# Patient Record
Sex: MALE | Employment: FULL TIME | URBAN - METROPOLITAN AREA
[De-identification: names, ages, dates, MRNs, and addresses within clinical notes are randomized per-mention and may not be internally consistent; named-entity substitution may affect disease eponyms.]

---

## 2023-08-15 ENCOUNTER — APPOINTMENT (OUTPATIENT)
Dept: PRIMARY CARE | Facility: CLINIC | Age: 53
End: 2023-08-15
Payer: OTHER GOVERNMENT

## 2023-09-05 ENCOUNTER — OFFICE VISIT (OUTPATIENT)
Dept: PRIMARY CARE | Facility: CLINIC | Age: 53
End: 2023-09-05
Payer: OTHER GOVERNMENT

## 2023-09-05 VITALS
TEMPERATURE: 97.9 F | BODY MASS INDEX: 29.24 KG/M2 | HEART RATE: 61 BPM | DIASTOLIC BLOOD PRESSURE: 81 MMHG | HEIGHT: 70 IN | OXYGEN SATURATION: 95 % | SYSTOLIC BLOOD PRESSURE: 122 MMHG | WEIGHT: 204.25 LBS

## 2023-09-05 DIAGNOSIS — Z12.11 SCREENING FOR COLON CANCER: ICD-10-CM

## 2023-09-05 DIAGNOSIS — Z82.49 FAMILY HISTORY OF EARLY CAD: ICD-10-CM

## 2023-09-05 DIAGNOSIS — E78.5 HYPERLIPIDEMIA, UNSPECIFIED HYPERLIPIDEMIA TYPE: Primary | ICD-10-CM

## 2023-09-05 PROCEDURE — 99203 OFFICE O/P NEW LOW 30 MIN: CPT | Performed by: FAMILY MEDICINE

## 2023-09-05 PROCEDURE — 1036F TOBACCO NON-USER: CPT | Performed by: FAMILY MEDICINE

## 2023-09-05 RX ORDER — CETIRIZINE HYDROCHLORIDE 10 MG/1
TABLET ORAL
COMMUNITY
Start: 2022-10-05

## 2023-09-05 RX ORDER — ALLOPURINOL 300 MG/1
1 TABLET ORAL DAILY
COMMUNITY
Start: 2018-05-17

## 2023-09-05 RX ORDER — ALLOPURINOL 100 MG/1
1 TABLET ORAL DAILY
COMMUNITY
Start: 2018-05-17

## 2023-09-05 RX ORDER — NAPROXEN 250 MG/1
250 TABLET ORAL
COMMUNITY

## 2023-09-05 ASSESSMENT — ENCOUNTER SYMPTOMS
LIGHT-HEADEDNESS: 0
SHORTNESS OF BREATH: 0
ABDOMINAL PAIN: 0
NUMBNESS: 0
WEAKNESS: 0
CONFUSION: 0
BLOOD IN STOOL: 0
FEVER: 0
UNEXPECTED WEIGHT CHANGE: 0
DYSURIA: 0
VOMITING: 0
DIZZINESS: 0
TROUBLE SWALLOWING: 0
COUGH: 0
CHILLS: 0
WHEEZING: 0
DIARRHEA: 0
DIFFICULTY URINATING: 0
NAUSEA: 0

## 2023-09-05 NOTE — ASSESSMENT & PLAN NOTE
"Discussed CT coronary artery calcium score, including risk of \"unnecessary\" procedures. He would like to do it.  "

## 2023-09-05 NOTE — PROGRESS NOTES
"Subjective   Patient ID: Loc Ellis is a 52 y.o. male who presents for Establish Care. States he found our practice online through his insurance company. [facility] are treating him for right hip tendonitis and arthritis in his lower back. Taking Naproxen. [facility] with his [facility] and back pain. No additional concerns; preferred to have a physician close to home in addition to the [facility].     HPI     Recently moved from [place]. [facility], will continue to see them for gout, hip and back problems. Not sure what his last uric acid level was, but was done recently. Hasn't had a significant gouty flare in a while. Takes Naproxen 250 mg once every day, takes fairly regularly.    Reports last cholesterol LDL a little high, not sure of the number. Last checked March 2023. Has since been trying to make dietary and lifestyle changes.    Reports up-to-date on colon cancer screening, colonoscopy last done 2015 or 2016, and due at 10y.    Review of Systems   Constitutional:  Negative for chills, fever and unexpected weight change.   HENT:  Negative for ear pain and trouble swallowing.    Respiratory:  Negative for cough, shortness of breath and wheezing.    Cardiovascular:  Negative for chest pain.   Gastrointestinal:  Negative for abdominal pain, blood in stool, diarrhea, nausea and vomiting.   Genitourinary:  Negative for difficulty urinating and dysuria.   Skin:  Negative for rash.   Neurological:  Negative for dizziness, syncope, weakness, light-headedness and numbness.   Psychiatric/Behavioral:  Negative for behavioral problems and confusion.        Objective   /81   Pulse 61   Temp 36.6 °C (97.9 °F)   Ht 1.765 m (5' 9.5\")   Wt 92.6 kg (204 lb 4 oz)   SpO2 95%   BMI 29.73 kg/m²     Physical Exam  Vitals and nursing note reviewed.   Constitutional:       General: He is not in acute distress.     Appearance: Normal appearance. He is well-developed. " "  HENT:      Head: Normocephalic and atraumatic.      Nose: Nose normal.   Eyes:      General: No scleral icterus.     Extraocular Movements: Extraocular movements intact.      Conjunctiva/sclera: Conjunctivae normal.   Neck:      Thyroid: No thyromegaly.      Vascular: No carotid bruit or JVD.   Cardiovascular:      Rate and Rhythm: Normal rate and regular rhythm.      Heart sounds: Normal heart sounds.   Pulmonary:      Effort: Pulmonary effort is normal. No respiratory distress.      Breath sounds: Normal breath sounds.   Abdominal:      General: Bowel sounds are normal. There is no distension.      Palpations: Abdomen is soft. There is no mass.      Tenderness: There is no abdominal tenderness. There is no guarding or rebound.   Musculoskeletal:      Cervical back: Normal range of motion. No tenderness.      Right lower leg: No edema.      Left lower leg: No edema.   Skin:     General: Skin is warm and dry.      Coloration: Skin is not jaundiced.   Neurological:      General: No focal deficit present.      Mental Status: He is alert and oriented to person, place, and time. Mental status is at baseline.   Psychiatric:         Mood and Affect: Mood normal.         Thought Content: Thought content normal.         Assessment/Plan   Problem List Items Addressed This Visit       Hyperlipidemia - Primary     Discussed CT coronary artery calcium score, including risk of \"unnecessary\" procedures. He would like to do it.         Relevant Orders    CT cardiac scoring wo IV contrast    Family history of early CAD     Father age 50         Relevant Orders    CT cardiac scoring wo IV contrast    Screening for colon cancer     Reports up-to-date on colon cancer screening, last done 2015 or 2016, and due at 10y.               "

## 2023-09-05 NOTE — PATIENT INSTRUCTIONS
Recommend a low-sugar, low-fat, low-cholesterol, high-fiber, heart-healthy diet and lifestyle, and regular cardio exercise and weight loss as appropriate.    Please check to see if your PSA (prostate-specific antigen) is being checked.    [facility] or [facility]    Please return for a follow-up appointment in 6-12 months, earlier if any question or concern.      For assistance with scheduling referrals or consultations, please call [phone]. For laboratory, radiology, and other tests, please call [phone] ([phone] for pediatrics). Please review prescription inserts and published information for possible adverse effects of all medications. Return after testing or consultation to review results and recommendations, if symptoms persist, change, worsen, or return, or if you have any question or concern. For non-emergencies, you may call the office. For emergencies, call 9-1-1 or go to the nearest Emergency Department. Please schedule additional appointment(s) to address concern(s) not addressed today.    In general, results are not released or discussed over the telephone. Results of tests done through [facility] are released via  [employer]:  [email or web address]   [employer] support line: [phone]    Until we complete our transition to the new system, additional information can be found at [email or web address] or on your Android or iOS (iPhone, iPad) device using the [employer] marco available free of charge in your device's marco store.

## 2023-12-13 ENCOUNTER — HOSPITAL ENCOUNTER (OUTPATIENT)
Dept: RADIOLOGY | Facility: HOSPITAL | Age: 53
Discharge: HOME | End: 2023-12-13
Payer: OTHER GOVERNMENT

## 2023-12-13 DIAGNOSIS — E78.5 HYPERLIPIDEMIA, UNSPECIFIED: ICD-10-CM

## 2023-12-13 DIAGNOSIS — Z82.49 FAMILY HISTORY OF ISCHEMIC HEART DISEASE AND OTHER DISEASES OF THE CIRCULATORY SYSTEM: ICD-10-CM

## 2023-12-13 PROCEDURE — 75571 CT HRT W/O DYE W/CA TEST: CPT

## 2024-09-05 ENCOUNTER — APPOINTMENT (OUTPATIENT)
Dept: PRIMARY CARE | Facility: CLINIC | Age: 54
End: 2024-09-05
Payer: OTHER GOVERNMENT

## 2024-12-10 ENCOUNTER — APPOINTMENT (OUTPATIENT)
Dept: PRIMARY CARE | Facility: CLINIC | Age: 54
End: 2024-12-10
Payer: OTHER GOVERNMENT

## 2024-12-10 VITALS
DIASTOLIC BLOOD PRESSURE: 81 MMHG | SYSTOLIC BLOOD PRESSURE: 130 MMHG | TEMPERATURE: 98.1 F | HEART RATE: 70 BPM | OXYGEN SATURATION: 94 % | BODY MASS INDEX: 30.4 KG/M2 | HEIGHT: 70 IN | WEIGHT: 212.38 LBS

## 2024-12-10 DIAGNOSIS — R74.8 ELEVATED LIVER ENZYMES: ICD-10-CM

## 2024-12-10 DIAGNOSIS — K92.1 BLOOD IN THE STOOL: ICD-10-CM

## 2024-12-10 DIAGNOSIS — E78.00 HYPERCHOLESTEROLEMIA: ICD-10-CM

## 2024-12-10 DIAGNOSIS — R73.03 PRE-DIABETES: ICD-10-CM

## 2024-12-10 DIAGNOSIS — R94.4 DECREASED GFR: ICD-10-CM

## 2024-12-10 DIAGNOSIS — Z00.00 WELL ADULT HEALTH CHECK: Primary | ICD-10-CM

## 2024-12-10 PROCEDURE — 3008F BODY MASS INDEX DOCD: CPT | Performed by: FAMILY MEDICINE

## 2024-12-10 PROCEDURE — 1036F TOBACCO NON-USER: CPT | Performed by: FAMILY MEDICINE

## 2024-12-10 PROCEDURE — 99214 OFFICE O/P EST MOD 30 MIN: CPT | Performed by: FAMILY MEDICINE

## 2024-12-10 PROCEDURE — 99396 PREV VISIT EST AGE 40-64: CPT | Performed by: FAMILY MEDICINE

## 2024-12-10 RX ORDER — LATANOPROST 50 UG/ML
1 SOLUTION/ DROPS OPHTHALMIC NIGHTLY
COMMUNITY

## 2024-12-10 ASSESSMENT — ENCOUNTER SYMPTOMS
JOINT SWELLING: 1
LIGHT-HEADEDNESS: 0
COUGH: 0
DIAPHORESIS: 0
HEADACHES: 0
FEVER: 0
PALPITATIONS: 0
ARTHRALGIAS: 1
CHOKING: 0
CHILLS: 0
APNEA: 0
UNEXPECTED WEIGHT CHANGE: 0
BACK PAIN: 1
LOSS OF SENSATION IN FEET: 0
DIZZINESS: 0
RHINORRHEA: 1
WHEEZING: 0
CHEST TIGHTNESS: 0
SHORTNESS OF BREATH: 0
OCCASIONAL FEELINGS OF UNSTEADINESS: 0
DEPRESSION: 0
BLOOD IN STOOL: 0

## 2024-12-10 ASSESSMENT — PATIENT HEALTH QUESTIONNAIRE - PHQ9
1. LITTLE INTEREST OR PLEASURE IN DOING THINGS: NOT AT ALL
SUM OF ALL RESPONSES TO PHQ9 QUESTIONS 1 AND 2: 0
2. FEELING DOWN, DEPRESSED OR HOPELESS: NOT AT ALL

## 2024-12-10 NOTE — ASSESSMENT & PLAN NOTE
Recheck when well hydrated.  Orders:    Albumin-Creatinine Ratio, Urine Random; Future    Comprehensive Metabolic Panel; Future    Comprehensive Metabolic Panel; Future    Follow Up In Primary Care; Future

## 2024-12-10 NOTE — PATIENT INSTRUCTIONS
Recommend gradually reducing and eliminating alcohol consumption. Please seek immediate medical attention or call 911 if, as you are stopping the alcohol, you become extremely anxious, agitated, shaky, heart racing, nauseated, confused, or have a seizure. Recommend taking vitamins B12, thiamine, and folate.    GFR (glomerular filtration rate, an estimate of kidney function) is mildly decreased. Recommend maintaining a healthy blood pressure, controlling or avoiding diabetes, staying adequately hydrated, and minimizing or avoiding drugs potentially toxic to the kidneys (e.g., ibuprofen, naproxen, or other NSAIDs; Tylenol/acetaminophen is safe for the kidneys).    Therapeutic lifestyle changes are the cornerstone of weight loss. In general, a weight loss program should have a balanced, calorie-appropriate, and sustainable (i.e., lifelong) and otherwise healthy diet. Although not necessarily an endorsement of Weight Watchers, their point system does help to simplify calorie counting for a calorie-appropriate diet, and can be applied to regular foods. A multivitamin might help ensure adequate intake of the nutrients it contains during weight loss. A reasonable rate of weight loss is usually 0.5 to 1 pound per week. No more than 2 lbs weight loss per week. Exercise to help maintain muscle mass (and for overall health) is usually advisable.    Several medications may be used to assist with weight loss, e.g., Wegovy, Zepbound, Saxenda, Contrave, Jose/orlistat (over-the-counter). I do not recommend Adipex/phentermine.    In general, weight loss supplements have not been proven effective. Depending on the ingredients, there are many herbal supplements that, though they might generally be safe, do have the potential to cause, e.g., liver problems, increased risk of bleeding. Because many if not most herbal supplements have not been thoroughly studied, and are not as tightly regulated as FDA-approved medications, there may be  inconsistent bioavailability and unpredictable drug interactions.    Please see the following page for some weight loss resources available at [facility]:  [email or web address]     Please return for a(n) kidney, liver, and medication follow-up appointment in 6 months, after tests to review results and options, earlier if any question or concern. Please return for your next Wellness visit in 12 months. Please schedule additional problem-focused appointment(s) to address additional problem(s).    Recommended vaccines:  Influenza, annual  Avoid taking Biotin (a vitamin, shows up particularly in hair/nail supplements) for a week prior to any blood tests, as it can interfere with certain results. Fasting for labs means 12 hours, nothing to eat or drink, except water and medications, unless directed otherwise.    For assistance with scheduling referrals or consultations, please call [phone]. For laboratory, radiology, and other tests, please call [phone] ([phone] for pediatrics). Please review prescription inserts and published information for possible adverse effects of all medications. Return after testing or consultation to review results and recommendations, if symptoms persist, change, worsen, or return, or if you have any question or concern. If you do not get results within 7-10 days, or you have any question or concern, please send a message, call the office ([phone]), or return to the office for a follow-up appointment. For non-emergencies, you may call the office. For emergencies, call 9-1-1 or go to the nearest Emergency Department. Please schedule additional appointment(s) to address concern(s) not addressed today. An annual Wellness visit is strongly recommended. A Wellness visit should be dedicated to addressing routine health maintenance matters (e.g., cancer  screenings, cardiovascular screening, etc.). Problem-focused visits, typically prompted by symptoms or specific concerns, are usually conducted separately, particularly if multiple or complex problems need to be addressed.    In general, results are not released or discussed over the telephone, but at an appointment or via  [employer]. Results of tests done through [facility] are released via  [employer] (see below).  [email or web address]   [employer] support line: [phone]

## 2024-12-10 NOTE — ASSESSMENT & PLAN NOTE
Has since cut back, now 1-2 alcoholic beverages daily.  Orders:    Comprehensive Metabolic Panel; Future    Follow Up In Primary Care; Future

## 2024-12-10 NOTE — ASSESSMENT & PLAN NOTE
Therapeutic lifestyle changes.  Orders:    Follow Up In Primary Care; Future    Hemoglobin A1C; Future     Home

## 2024-12-10 NOTE — PROGRESS NOTES
"Subjective   Patient ID: Loc Ellis is a 54 y.o. male who presents for Annual Exam (Pt presents for Wellness exam, discuss exercise and weight, no refills).  HPI Historian(s): Self    Generally feeling well.     Reports last colonoscopy about 5y ago, and due at 10y.    Review of Systems   Constitutional:  Negative for chills, diaphoresis, fever and unexpected weight change.   HENT:  Positive for congestion, rhinorrhea and sneezing.    Eyes:  Negative for visual disturbance.   Respiratory:  Negative for apnea (no PND), cough, choking, chest tightness, shortness of breath and wheezing.    Cardiovascular:  Negative for chest pain, palpitations and leg swelling.   Gastrointestinal:  Negative for blood in stool.   Musculoskeletal:  Positive for arthralgias, back pain and joint swelling.   Neurological:  Negative for dizziness, syncope, light-headedness and headaches.   All other systems reviewed and are negative.    No LMP for male patient.    Patient Care Team:  John Carter DO as PCP - General (Family Medicine)  Lorenzo Jasso OD (Optometry)    Current Outpatient Medications   Medication Instructions    allopurinol (Zyloprim) 100 mg tablet 1 tablet, Daily    allopurinol (Zyloprim) 300 mg tablet 1 tablet, Daily    cetirizine (ZyrTEC) 10 mg tablet     latanoprost (Xalatan) 0.005 % ophthalmic solution 1 drop, Nightly    naproxen (NAPROSYN) 250 mg, 2 times daily (morning and late afternoon)       Objective   /81   Pulse 70   Temp 36.7 °C (98.1 °F)   Ht 1.772 m (5' 9.75\")   Wt 96.3 kg (212 lb 6 oz)   SpO2 94%   BMI 30.69 kg/m²           Physical Exam  Vitals and nursing note reviewed.   Constitutional:       General: He is not in acute distress.     Appearance: Normal appearance. He is well-developed.      Comments: No assistive device presently being used.   HENT:      Head: Normocephalic and atraumatic.      Nose: Nose normal.   Eyes:      General: No scleral icterus.     Extraocular Movements: " Extraocular movements intact.      Conjunctiva/sclera: Conjunctivae normal.   Neck:      Thyroid: No thyromegaly.      Vascular: No carotid bruit or JVD.   Cardiovascular:      Rate and Rhythm: Normal rate and regular rhythm.      Heart sounds: Normal heart sounds.   Pulmonary:      Effort: Pulmonary effort is normal. No respiratory distress.      Breath sounds: Normal breath sounds.   Abdominal:      General: Bowel sounds are normal. There is no distension.      Palpations: Abdomen is soft. There is no mass.      Tenderness: There is no abdominal tenderness. There is no guarding or rebound.   Musculoskeletal:      Cervical back: Normal range of motion. No tenderness.      Right lower leg: No edema.      Left lower leg: No edema.   Skin:     General: Skin is warm and dry.      Coloration: Skin is not jaundiced.   Neurological:      General: No focal deficit present.      Mental Status: He is alert and oriented to person, place, and time. Mental status is at baseline.   Psychiatric:         Mood and Affect: Mood normal.         Behavior: Behavior normal.         Thought Content: Thought content normal.         Assessment & Plan  Well adult health check  54yM doing fairly well. [facility]. Reports due for repeat colonoscopy in 5y.       Pre-diabetes  Therapeutic lifestyle changes.  Orders:    Follow Up In Primary Care; Future    Hemoglobin A1C; Future    Hypercholesterolemia  Therapeutic lifestyle changes. CT Coronary Artery Calcium Score = 0.       Elevated liver enzymes  Has since cut back, now 1-2 alcoholic beverages daily.  Orders:    Comprehensive Metabolic Panel; Future    Follow Up In Primary Care; Future    Decreased GFR  Recheck when well hydrated.  Orders:    Albumin-Creatinine Ratio, Urine Random; Future    Comprehensive Metabolic Panel; Future    Comprehensive Metabolic Panel; Future    Follow Up In Primary Care; Future    Blood in the stool    Orders:    Occult Blood, Stool; Future

## 2024-12-13 ENCOUNTER — LAB (OUTPATIENT)
Dept: LAB | Facility: LAB | Age: 54
End: 2024-12-13
Payer: OTHER GOVERNMENT

## 2024-12-13 DIAGNOSIS — K92.1 BLOOD IN THE STOOL: ICD-10-CM

## 2024-12-13 DIAGNOSIS — R73.03 PRE-DIABETES: ICD-10-CM

## 2024-12-13 DIAGNOSIS — R94.4 DECREASED GFR: ICD-10-CM

## 2024-12-13 DIAGNOSIS — R74.8 ELEVATED LIVER ENZYMES: ICD-10-CM

## 2024-12-13 LAB
ALBUMIN SERPL BCP-MCNC: 4.3 G/DL (ref 3.4–5)
ALP SERPL-CCNC: 74 U/L (ref 33–120)
ALT SERPL W P-5'-P-CCNC: 30 U/L (ref 10–52)
ANION GAP SERPL CALC-SCNC: 14 MMOL/L (ref 10–20)
AST SERPL W P-5'-P-CCNC: 27 U/L (ref 9–39)
BILIRUB SERPL-MCNC: 0.8 MG/DL (ref 0–1.2)
BUN SERPL-MCNC: 11 MG/DL (ref 6–23)
CALCIUM SERPL-MCNC: 9.6 MG/DL (ref 8.6–10.3)
CHLORIDE SERPL-SCNC: 102 MMOL/L (ref 98–107)
CO2 SERPL-SCNC: 26 MMOL/L (ref 21–32)
CREAT SERPL-MCNC: 1.26 MG/DL (ref 0.5–1.3)
EGFRCR SERPLBLD CKD-EPI 2021: 68 ML/MIN/1.73M*2
EST. AVERAGE GLUCOSE BLD GHB EST-MCNC: 100 MG/DL
GLUCOSE SERPL-MCNC: 98 MG/DL (ref 74–99)
HBA1C MFR BLD: 5.1 %
HEMOCCULT SP1 STL QL: NEGATIVE
POTASSIUM SERPL-SCNC: 4.3 MMOL/L (ref 3.5–5.3)
PROT SERPL-MCNC: 7.1 G/DL (ref 6.4–8.2)
SODIUM SERPL-SCNC: 138 MMOL/L (ref 136–145)

## 2024-12-13 PROCEDURE — 80053 COMPREHEN METABOLIC PANEL: CPT

## 2024-12-13 PROCEDURE — 36415 COLL VENOUS BLD VENIPUNCTURE: CPT

## 2024-12-13 PROCEDURE — 83036 HEMOGLOBIN GLYCOSYLATED A1C: CPT

## 2024-12-13 PROCEDURE — 82270 OCCULT BLOOD FECES: CPT

## 2025-06-11 ENCOUNTER — APPOINTMENT (OUTPATIENT)
Dept: PRIMARY CARE | Facility: CLINIC | Age: 55
End: 2025-06-11
Payer: OTHER GOVERNMENT